# Patient Record
Sex: FEMALE | URBAN - METROPOLITAN AREA
[De-identification: names, ages, dates, MRNs, and addresses within clinical notes are randomized per-mention and may not be internally consistent; named-entity substitution may affect disease eponyms.]

---

## 2023-07-06 ENCOUNTER — OFFICE VISIT (OUTPATIENT)
Dept: URBAN - METROPOLITAN AREA CLINIC 56 | Facility: LOCATION | Age: 48
End: 2023-07-06
Payer: COMMERCIAL

## 2023-07-06 DIAGNOSIS — H02.64 XANTHELASMA OF LEFT UPPER EYELID: Primary | ICD-10-CM

## 2023-07-06 DIAGNOSIS — H02.62 XANTHELASMA OF RIGHT LOWER EYELID: ICD-10-CM

## 2023-07-06 DIAGNOSIS — H02.65 XANTHELASMA OF LEFT LOWER EYELID: ICD-10-CM

## 2023-07-06 PROCEDURE — 99204 OFFICE O/P NEW MOD 45 MIN: CPT | Performed by: STUDENT IN AN ORGANIZED HEALTH CARE EDUCATION/TRAINING PROGRAM

## 2023-07-06 PROCEDURE — 92285 EXTERNAL OCULAR PHOTOGRAPHY: CPT | Performed by: STUDENT IN AN ORGANIZED HEALTH CARE EDUCATION/TRAINING PROGRAM

## 2023-07-06 NOTE — IMPRESSION/PLAN
Impression: Xanthelasma of left upper eyelid: H02.64. Plan: ELVIA medial xanthelasma and bilateral lateral canthus xanthelasma. Less common location at lateral canthus, but classic appearance. Patient denies history of hyperlipidemia. Recommend evaluation with PCP to monitor cholesterol level. Discussed option of cosmetic excision of lesions. Patient will think about it. Return in 6 months to monitor for changes, call sooner prn.